# Patient Record
Sex: MALE | Race: AMERICAN INDIAN OR ALASKA NATIVE
[De-identification: names, ages, dates, MRNs, and addresses within clinical notes are randomized per-mention and may not be internally consistent; named-entity substitution may affect disease eponyms.]

---

## 2017-06-10 ENCOUNTER — HOSPITAL ENCOUNTER (EMERGENCY)
Dept: HOSPITAL 8 - ED | Age: 37
Discharge: HOME | End: 2017-06-10
Payer: MEDICAID

## 2017-06-10 VITALS — BODY MASS INDEX: 37.62 KG/M2 | WEIGHT: 262.79 LBS | HEIGHT: 70 IN

## 2017-06-10 VITALS — SYSTOLIC BLOOD PRESSURE: 125 MMHG | DIASTOLIC BLOOD PRESSURE: 80 MMHG

## 2017-06-10 DIAGNOSIS — M25.462: ICD-10-CM

## 2017-06-10 DIAGNOSIS — M25.562: Primary | ICD-10-CM

## 2017-07-15 ENCOUNTER — HOSPITAL ENCOUNTER (EMERGENCY)
Dept: HOSPITAL 8 - ED | Age: 37
Discharge: HOME | End: 2017-07-15
Payer: MEDICAID

## 2017-07-15 VITALS — WEIGHT: 258.82 LBS | BODY MASS INDEX: 37.05 KG/M2 | HEIGHT: 70 IN

## 2017-07-15 VITALS — SYSTOLIC BLOOD PRESSURE: 130 MMHG | DIASTOLIC BLOOD PRESSURE: 84 MMHG
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
## 2017-07-15 DIAGNOSIS — S39.012A: Primary | ICD-10-CM
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
## 2017-07-15 PROCEDURE — 99283 EMERGENCY DEPT VISIT LOW MDM: CPT

## 2017-08-22 ENCOUNTER — HOSPITAL ENCOUNTER (EMERGENCY)
Dept: HOSPITAL 8 - ED | Age: 37
Discharge: HOME | End: 2017-08-22
Payer: MEDICAID

## 2017-08-22 VITALS — HEIGHT: 71 IN | BODY MASS INDEX: 36.48 KG/M2 | WEIGHT: 260.59 LBS

## 2017-08-22 VITALS — SYSTOLIC BLOOD PRESSURE: 122 MMHG | DIASTOLIC BLOOD PRESSURE: 75 MMHG

## 2017-08-22 DIAGNOSIS — M62.830: ICD-10-CM

## 2017-08-22 DIAGNOSIS — M62.838: Primary | ICD-10-CM

## 2017-08-22 PROCEDURE — 99283 EMERGENCY DEPT VISIT LOW MDM: CPT

## 2017-08-22 PROCEDURE — 96372 THER/PROPH/DIAG INJ SC/IM: CPT

## 2019-02-09 ENCOUNTER — HOSPITAL ENCOUNTER (EMERGENCY)
Dept: HOSPITAL 8 - ED | Age: 39
Discharge: HOME | End: 2019-02-09
Payer: COMMERCIAL

## 2019-02-09 VITALS — WEIGHT: 279.11 LBS | BODY MASS INDEX: 39.96 KG/M2 | HEIGHT: 70 IN

## 2019-02-09 VITALS — SYSTOLIC BLOOD PRESSURE: 147 MMHG | DIASTOLIC BLOOD PRESSURE: 75 MMHG

## 2019-02-09 DIAGNOSIS — Z88.2: ICD-10-CM

## 2019-02-09 DIAGNOSIS — J41.1: Primary | ICD-10-CM

## 2019-02-09 PROCEDURE — 99283 EMERGENCY DEPT VISIT LOW MDM: CPT

## 2019-02-09 PROCEDURE — 71046 X-RAY EXAM CHEST 2 VIEWS: CPT

## 2019-02-09 NOTE — NUR
Pt arrives to ed with c/o of URI and cough. Pt reports he is losign his voice 
and now starting to develop HA. pt neuro intact and nadn.

## 2020-01-30 ENCOUNTER — PHYSICAL THERAPY (OUTPATIENT)
Dept: PHYSICAL THERAPY | Facility: REHABILITATION | Age: 40
End: 2020-01-30
Attending: FAMILY MEDICINE
Payer: OTHER GOVERNMENT

## 2020-01-30 DIAGNOSIS — M25.512 LEFT SHOULDER PAIN, UNSPECIFIED CHRONICITY: ICD-10-CM

## 2020-01-30 PROCEDURE — 97110 THERAPEUTIC EXERCISES: CPT

## 2020-01-30 PROCEDURE — 97161 PT EVAL LOW COMPLEX 20 MIN: CPT

## 2020-01-30 PROCEDURE — 97014 ELECTRIC STIMULATION THERAPY: CPT

## 2020-01-30 ASSESSMENT — ENCOUNTER SYMPTOMS
PAIN SCALE AT LOWEST: 2
PAIN SCALE: 4
PAIN SCALE AT HIGHEST: 6

## 2020-01-30 NOTE — OP THERAPY EVALUATION
Outpatient Physical Therapy  INITIAL EVALUATION    [facility] [address]  [address]  [facility] [address]  Phone:  [phone]  Fax:  [phone]    Date of Evaluation: 2020    Patient: Jared Chen  YOB: 1980  MRN: [id number]     Referring Provider: Lokesh Rodrigues M.D.  [address] [place] [address]   Referring Diagnosis Pain in left shoulder [M25.512]     Time Calculation  Start time: 48  Stop time: 940 Time Calculation (min): 52 minutes       Physical Therapy Occurrence Codes    Date of onset of impairment:  19   Date physical therapy care plan established or reviewed:  20   Date physical therapy treatment started:  20          Chief Complaint: No chief complaint on file.    Visit Diagnoses     ICD-10-CM   1. Left shoulder pain, unspecified chronicity M25.512         Subjective   History of Present Illness:     History of chief complaint:  Patient reports sudden onset of L shoulder pain with symptoms that are  episodic depending activity.  Patient reports that it may be a little better over the past few weeks but he has been doing less with his shoulder    Pain:     Current pain ratin    At best pain ratin    At worst pain ratin    Location:  On top of L shoulder with some neck tightness    Aggravating factors:  Sleep: up 3-4 /night  Shovel or split wood  Wash hair  Any movement reaching up/out        No past medical history on file.  Past Surgical History:   Procedure Laterality Date   • MANDIBLE FRACTURE ORIF  3/30/2009    Performed by JASPER FRANKLIN at SURGERY [facility] ORS   • ARCH BAR APPLICATION  3/30/2009    Performed by JASPER FRANKLIN at SURGERY [facility] ORS       Precautions:       Objective   Observation and functional movement:  Forward scap/head    Range of motion and strength:    Shoulder ROM:   AROM: R flexion: 175  Abduction: 170 HBB: t7 ER:60  AROM: L flexion: 150-erp Abduction: 135-erp HBB:t8  " ER:50-erp    Resisted cuff tests:  IR:                L: strong  ER:                  L:strong mild  empty can:                  L:mod, strong  Full can R:  L:strong  drop arm            L: mild pain  impingement sign:       L: +  (-) infraspinatus lag test and end range with 90 abd    Palpation and joint mobility:     ttp infra/t-minor and post deltoid            Therapeutic Treatments and Modalities:     Therapeutic Treatment and Modalities Summary: Cupping arom for flex/abd//all resisted tests strong\"almost no pain\"  Astatula #2 with modified box--hep  Russian 10/10 ball roll in s/l w/ #2 wrist weight x 7', then 8' mhp  // \"good no pain\"    Time-based treatments/modalities:          Assessment, Response and Plan:   Assessment details:  Patient presents with a RTC strain/tear with pain limiting AROM, difficulty sleeping and performing ADLs.  Patient presented with strong, painful resisted cuff testing, (+) impingement sign and (-) for infraspintaus lag sing.  Noted forward head and scapular positioning.  Patient should do well for goals if compliant with POC.  Prognosis: good    Goals:   Short Term Goals:   Sleep through night  Wash hair w/o pain  DASH<30%  Short term goal time span:  2-4 weeks      Long Term Goals:      split wood w/o pain  Wash hair w/o pain  DASH<10%  Sleep through night  DASH<30%  Long term goal time span:  6-8 weeks    Plan:   Therapy options:  Physical therapy treatment to continue  Planned therapy interventions:  Manual Therapy (CPT 78225), Therapeutic Exercise (CPT 47809) and E Stim Unattended (CPT 24740)  Other planned therapy interventions:  Dry needle  Frequency:  2x week  Duration in weeks:  8  Duration in visits:  13  Plan details:  Nicholas progression, scap stab, iastm/cupping/ DN?? E-stim      Functional Assessment Used        Referring provider co-signature:  I have reviewed this plan of care and my co-signature certifies the need for services.      Physician Signature: " ________________________________ Date: ______________

## 2020-02-04 ENCOUNTER — PHYSICAL THERAPY (OUTPATIENT)
Dept: PHYSICAL THERAPY | Facility: REHABILITATION | Age: 40
End: 2020-02-04
Attending: FAMILY MEDICINE
Payer: OTHER GOVERNMENT

## 2020-02-04 DIAGNOSIS — M25.512 LEFT SHOULDER PAIN, UNSPECIFIED CHRONICITY: ICD-10-CM

## 2020-02-04 PROCEDURE — 97014 ELECTRIC STIMULATION THERAPY: CPT

## 2020-02-04 PROCEDURE — 97110 THERAPEUTIC EXERCISES: CPT

## 2020-02-04 NOTE — OP THERAPY DAILY TREATMENT
Outpatient Physical Therapy  DAILY TREATMENT     [facility] [address].  [address]  [place] [address]  Phone:  [phone]  Fax:  [phone]    Date: 02/04/2020    Patient: Jared Chen  YOB: 1980  MRN: [id number]     Time Calculation  Start time: 0115  Stop time: 0200 Time Calculation (min): 45 minutes       Chief Complaint: No chief complaint on file.    Visit #: 2    SUBJECTIVE:  Sleeping better, less intense and less frequently---wash hair w/o pain    OBJECTIVE:  All resisted cuff test strong w/o pain    Therapeutic Treatments and Modalities:     Therapeutic Treatment and Modalities Summary: ube x 5' no pain  sahrman box/wall with #2 in front of mirror  Prone inés--hep  Wall inés--hep  S/l Yemeni 10/10 infra/deltoid ball roll x 15'-last 5'     Time-based treatments/modalities:  Therapeutic exercise minutes (CPT 91142): 25 minutes       Pain rating before treatment: 0  Pain rating after treatment: 0    ASSESSMENT:    limited low trap and infra strength but tolerated ex w.o pain    PLAN/RECOMMENDATIONS:   Inés progression

## 2020-02-12 ENCOUNTER — APPOINTMENT (OUTPATIENT)
Dept: PHYSICAL THERAPY | Facility: REHABILITATION | Age: 40
End: 2020-02-12
Attending: FAMILY MEDICINE
Payer: OTHER GOVERNMENT

## 2020-02-12 NOTE — OP THERAPY DAILY TREATMENT
Outpatient Physical Therapy  DAILY TREATMENT     [facility] [address].  [address]  [place] [address]  Phone:  [phone]  Fax:  [phone]    Date: 02/12/2020    Patient: Jared Chen  YOB: 1980  MRN: [id number]     Time Calculation               Chief Complaint: No chief complaint on file.    Visit #: 3    SUBJECTIVE:  Sleeping better, less intense and less frequently---wash hair w/o pain    OBJECTIVE:  All resisted cuff test strong w/o pain    Therapeutic Treatments and Modalities:     Therapeutic Treatment and Modalities Summary: ube x 5' no pain  sahrman box/wall with #2 in front of mirror  Prone angels--hep  Wall angels--hep  S/l Chilean 10/10 infra/deltoid ball roll x 15'-last 5'     FR progression    Time-based treatments/modalities:          Pain rating before treatment: 0  Pain rating after treatment: 0    ASSESSMENT:    limited low trap and infra strength but tolerated ex w.o pain    PLAN/RECOMMENDATIONS:   Urmila progression

## 2020-02-13 ENCOUNTER — APPOINTMENT (OUTPATIENT)
Dept: PHYSICAL THERAPY | Facility: REHABILITATION | Age: 40
End: 2020-02-13
Attending: FAMILY MEDICINE
Payer: OTHER GOVERNMENT

## 2020-02-18 ENCOUNTER — APPOINTMENT (OUTPATIENT)
Dept: PHYSICAL THERAPY | Facility: REHABILITATION | Age: 40
End: 2020-02-18
Attending: FAMILY MEDICINE
Payer: OTHER GOVERNMENT

## 2020-02-20 ENCOUNTER — APPOINTMENT (OUTPATIENT)
Dept: PHYSICAL THERAPY | Facility: REHABILITATION | Age: 40
End: 2020-02-20
Attending: FAMILY MEDICINE
Payer: OTHER GOVERNMENT

## 2020-02-23 ENCOUNTER — HOSPITAL ENCOUNTER (EMERGENCY)
Dept: HOSPITAL 8 - ED | Age: 40
Discharge: HOME | End: 2020-02-23
Payer: COMMERCIAL

## 2020-02-23 VITALS — BODY MASS INDEX: 41.03 KG/M2 | HEIGHT: 70 IN | WEIGHT: 286.6 LBS

## 2020-02-23 VITALS — DIASTOLIC BLOOD PRESSURE: 91 MMHG | SYSTOLIC BLOOD PRESSURE: 145 MMHG

## 2020-02-23 DIAGNOSIS — F17.200: ICD-10-CM

## 2020-02-23 DIAGNOSIS — B34.9: Primary | ICD-10-CM

## 2020-02-23 DIAGNOSIS — Z98.890: ICD-10-CM

## 2020-02-23 LAB
ALBUMIN SERPL-MCNC: 3.9 G/DL (ref 3.4–5)
ANION GAP SERPL CALC-SCNC: 8 MMOL/L (ref 5–15)
BASOPHILS # BLD AUTO: 0.02 X10^3/UL (ref 0–0.1)
BASOPHILS NFR BLD AUTO: 0 % (ref 0–1)
CALCIUM SERPL-MCNC: 8 MG/DL (ref 8.5–10.1)
CHLORIDE SERPL-SCNC: 109 MMOL/L (ref 98–107)
CREAT SERPL-MCNC: 0.98 MG/DL (ref 0.7–1.3)
EOSINOPHIL # BLD AUTO: 0.09 X10^3/UL (ref 0–0.4)
EOSINOPHIL NFR BLD AUTO: 2 % (ref 1–7)
ERYTHROCYTE [DISTWIDTH] IN BLOOD BY AUTOMATED COUNT: 14.1 % (ref 9.4–14.8)
LYMPHOCYTES # BLD AUTO: 0.71 X10^3/UL (ref 1–3.4)
LYMPHOCYTES NFR BLD AUTO: 14 % (ref 22–44)
MCH RBC QN AUTO: 31.6 PG (ref 27.5–34.5)
MCHC RBC AUTO-ENTMCNC: 34.6 G/DL (ref 33.2–36.2)
MCV RBC AUTO: 91.4 FL (ref 81–97)
MD: NO
MONOCYTES # BLD AUTO: 0.73 X10^3/UL (ref 0.2–0.8)
MONOCYTES NFR BLD AUTO: 14 % (ref 2–9)
NEUTROPHILS # BLD AUTO: 3.63 X10^3/UL (ref 1.8–6.8)
NEUTROPHILS NFR BLD AUTO: 70 % (ref 42–75)
PLATELET # BLD AUTO: 213 X10^3/UL (ref 130–400)
PMV BLD AUTO: 8.1 FL (ref 7.4–10.4)
RBC # BLD AUTO: 4.75 X10^6/UL (ref 4.38–5.82)

## 2020-02-23 PROCEDURE — 71045 X-RAY EXAM CHEST 1 VIEW: CPT

## 2020-02-23 PROCEDURE — 36415 COLL VENOUS BLD VENIPUNCTURE: CPT

## 2020-02-23 PROCEDURE — 94640 AIRWAY INHALATION TREATMENT: CPT

## 2020-02-23 PROCEDURE — 82040 ASSAY OF SERUM ALBUMIN: CPT

## 2020-02-23 PROCEDURE — 85025 COMPLETE CBC W/AUTO DIFF WBC: CPT

## 2020-02-23 PROCEDURE — 99284 EMERGENCY DEPT VISIT MOD MDM: CPT

## 2020-02-23 PROCEDURE — 80048 BASIC METABOLIC PNL TOTAL CA: CPT

## 2020-02-23 PROCEDURE — 87400 INFLUENZA A/B EACH AG IA: CPT

## 2020-02-23 NOTE — NUR
AMBULATED BACK TO ROOM WITH STEADY GAIT. PT PRESENTS TO ED WITH C/O BODY ACHES, 
SUBJECTIVE FEVER, AND NON PRODUCTIVE COUGH X 1 WEEKS. DENIES BEING AROUND ANY 
SICK RECENTLY. MILD AMOUNT OF DISTRESS NOTED. BREATHING REGULAR AND UNLABORED. 
POC DISCUSSED. WILL CONTINUE TO MONITOR.

## 2020-03-26 ENCOUNTER — TELEPHONE (OUTPATIENT)
Dept: PHYSICAL THERAPY | Facility: REHABILITATION | Age: 40
End: 2020-03-26

## 2020-03-26 NOTE — OP THERAPY DISCHARGE SUMMARY
Outpatient Physical Therapy  DISCHARGE SUMMARY NOTE      [address]  [address]  [place] [address]  Phone:  [phone]  Fax:  [phone]    Date of Visit: 03/26/2020    Patient: Jared Chen  YOB: 1980  MRN: [id number]     Referring Provider:  Lokesh Rodrigues   Referring Diagnosis  Left shoulder pain, unspecified chronicity M25.512        Physical Therapy Occurrence Codes    Date of onset of impairment:  12/16/19   Date physical therapy care plan established or reviewed:  1/30/20   Date physical therapy treatment started:  1/30/20          Functional Assessment Used        Your patient is being discharged from Physical Therapy with the following comments:  Patient was only seen for two vistis  · Goals partially met  SUBJECTIVE:  Upon last visit, patient reported Sleeping better, less intense and less frequent pain and was able to wash hair w/o pain     OBJECTIVE:  All resisted cuff test strong w/o pain       ASSESSMENT:   limited low trap and infra strength but tolerated ex w/o pain     No patient contact since  2 / 4 /20 .  Patient is independent with his HEP and is being discharged from therapy at this time.         Osman Razo, PT, DPT, OCS    Date: 3/26/2020        None

## 2020-08-13 ENCOUNTER — HOSPITAL ENCOUNTER (EMERGENCY)
Dept: HOSPITAL 8 - ED | Age: 40
Discharge: LEFT BEFORE DISCHARGE | End: 2020-08-13
Payer: COMMERCIAL

## 2020-08-13 ENCOUNTER — HOSPITAL ENCOUNTER (EMERGENCY)
Facility: MEDICAL CENTER | Age: 40
End: 2020-08-13
Attending: EMERGENCY MEDICINE | Admitting: EMERGENCY MEDICINE
Payer: OTHER GOVERNMENT

## 2020-08-13 VITALS
HEART RATE: 80 BPM | DIASTOLIC BLOOD PRESSURE: 92 MMHG | TEMPERATURE: 98 F | RESPIRATION RATE: 16 BRPM | HEIGHT: 70 IN | BODY MASS INDEX: 35.52 KG/M2 | SYSTOLIC BLOOD PRESSURE: 132 MMHG | OXYGEN SATURATION: 98 %

## 2020-08-13 DIAGNOSIS — Z53.21: ICD-10-CM

## 2020-08-13 DIAGNOSIS — M54.9: Primary | ICD-10-CM

## 2020-08-13 DIAGNOSIS — M54.50 ACUTE LEFT-SIDED LOW BACK PAIN WITHOUT SCIATICA: ICD-10-CM

## 2020-08-13 LAB
APPEARANCE UR: CLEAR
BILIRUB UR QL STRIP.AUTO: NEGATIVE
COLOR UR: YELLOW
EKG IMPRESSION: NORMAL
GLUCOSE UR STRIP.AUTO-MCNC: NEGATIVE MG/DL
KETONES UR STRIP.AUTO-MCNC: NEGATIVE MG/DL
LEUKOCYTE ESTERASE UR QL STRIP.AUTO: NEGATIVE
MICRO URNS: NORMAL
NITRITE UR QL STRIP.AUTO: NEGATIVE
PH UR STRIP.AUTO: 5.5 [PH] (ref 5–8)
PROT UR QL STRIP: NEGATIVE MG/DL
RBC UR QL AUTO: NEGATIVE
SP GR UR REFRACTOMETRY: 1.03

## 2020-08-13 PROCEDURE — 96372 THER/PROPH/DIAG INJ SC/IM: CPT

## 2020-08-13 PROCEDURE — 93005 ELECTROCARDIOGRAM TRACING: CPT

## 2020-08-13 PROCEDURE — 81003 URINALYSIS AUTO W/O SCOPE: CPT

## 2020-08-13 PROCEDURE — 93005 ELECTROCARDIOGRAM TRACING: CPT | Performed by: EMERGENCY MEDICINE

## 2020-08-13 PROCEDURE — 700111 HCHG RX REV CODE 636 W/ 250 OVERRIDE (IP): Performed by: EMERGENCY MEDICINE

## 2020-08-13 PROCEDURE — 99283 EMERGENCY DEPT VISIT LOW MDM: CPT

## 2020-08-13 RX ORDER — KETOROLAC TROMETHAMINE 30 MG/ML
30 INJECTION, SOLUTION INTRAMUSCULAR; INTRAVENOUS ONCE
Status: COMPLETED | OUTPATIENT
Start: 2020-08-13 | End: 2020-08-13

## 2020-08-13 RX ADMIN — KETOROLAC TROMETHAMINE 30 MG: 30 INJECTION, SOLUTION INTRAMUSCULAR at 20:34

## 2020-08-14 NOTE — ED PROVIDER NOTES
"ED Provider Note    CHIEF COMPLAINT  Chief Complaint   Patient presents with   • Low Back Pain       HPI  Jared Chen is a 39 y.o. male who presents to the emergency department chief complaint of left low back pain.  He states he woke up this morning and is just been generally present it is worse with movement does not radiate to much of the abdomen to the groin of the testicles to the upper back to the legs buttocks there is no associate weakness numbness or tingling is about a 6 out of 10 and worse with movement he took some Tylenol this morning for a headache but thought it would get better on its own and when it did not he decided to come in to the ED.  He denies any acute trauma.    REVIEW OF SYSTEMS  Positives as above. Pertinent negatives include weakness numbness tingling abdominal pain dysuria hematuria testicular pain fevers chills cough congestion rash trauma  All other review of systems are negative    PAST MEDICAL HISTORY   has a past medical history of Heart murmur.    SOCIAL HISTORY  Social History     Tobacco Use   • Smoking status: Light Tobacco Smoker     Packs/day: 0.50     Types: Cigarettes   • Smokeless tobacco: Former User   Substance and Sexual Activity   • Alcohol use: No   • Drug use: No     Types: Inhaled   • Sexual activity: Not on file       SURGICAL HISTORY   has a past surgical history that includes mandible fracture orif (3/30/2009) and arch bar application (3/30/2009).    CURRENT MEDICATIONS  Home Medications    **Home medications have not yet been reviewed for this encounter**         ALLERGIES  Allergies   Allergen Reactions   • Sulfa Drugs Unspecified     Pt states \"I was a childhood allergie, not sure what happens\".         PHYSICAL EXAM  VITAL SIGNS: /92   Pulse 81   Temp 36.7 °C (98 °F) (Temporal)   Resp 16   Ht 1.778 m (5' 10\")   SpO2 98%   BMI 35.52 kg/m²    Pulse ox interpretation: I interpret this pulse ox as normal.  Constitutional: Alert in no " apparent distress.  HENT: Normocephalic, Atraumatic, MMM  Eyes: PERound. Conjunctiva normal, non-icteric.   Heart: Regular rate and rhythm, no murmurs.    Lungs: Clear to auscultation bilaterally. No resp distress, breath sounds equal  Abdomen: Questionable mid flank belly tenderness but only in very deep palpation states it radiates to his back, non-distended, normal bowel sounds  Back: Low left paraspinal muscular tenderness no SI tenderness no CVA tenderness no midline tenderness of any part of the back  Skin: Warm, Dry, No erythema, No rash.   Neurologic: Alert and oriented, Grossly non-focal.       DIFFERENTIAL DIAGNOSIS AND WORK UP PLAN    This is a 39 y.o. male who presents with most likely a left low back musculoskeletal strain or sprain, there is no evidence of rash no swelling no step-offs does not radiate anywhere there is no evidence of sciatica, this could be an atypical renal stone but his abdomen is really soft nontender no nausea or vomiting his vital signs are otherwise well-appearing.  Will evaluate with urinalysis for any signs of hematuria and if that is positive will perform a CT scan otherwise we will treat the patient with some Toradol for the muscular discomfort    Pertinent Lab Findings  Labs Reviewed   URINALYSIS,CULTURE IF INDICATED   REFRACTOMETER SG       Radiology  No orders to display     The radiologist's interpretation of all radiological studies have been reviewed by me.    COURSE & MEDICAL DECISION MAKING  Pertinent Labs & Imaging studies reviewed. (See chart for details)    8:26 PM  I reassessed patient at the bedside, there is no blood in his urine he did not really have any abdominal pain so I doubt this is a renal stone, no neurologic complaints that are associated with a pinched nerve mostly lower back muscular strain or sprain.  He will receive some Toradol we discussed stretching ice and ibuprofen as needed he will return to the emerge department for any new or worsening  "issues.    /92   Pulse 80   Temp 36.7 °C (98 °F) (Temporal)   Resp 16   Ht 1.778 m (5' 10\")   SpO2 98%   BMI 35.52 kg/m²       I verified that the patient was wearing a mask and I was wearing appropriate PPE every time I entered the room. The patient's mask was on the patient at all times during my encounter except for a brief view of the oropharynx.    The patient will return for new or worsening symptoms and is stable at the time of discharge.    The patient is referred to a primary physician for blood pressure management, diabetic screening, and for all other preventative health concerns.    DISPOSITION:  Patient will be discharged home in stable condition.    FOLLOW UP:  Lokesh Rodrigues M.D.  [address] [facility] [address] [phone]    Schedule an appointment as soon as possible for a visit       [facility], Emergency Dept  [address]  [facility] [id number] [phone]    If symptoms worsen      OUTPATIENT MEDICATIONS:  Discharge Medication List as of 8/13/2020  8:37 PM            FINAL IMPRESSION  1. Acute left-sided low back pain without sciatica                Electronically signed by: Yasmin Dejesus M.D., 8/13/2020 8:03 PM    This dictation has been created using voice recognition software and/or scribes. The accuracy of the dictation is limited by the abilities of the software and the expertise of the scribes. I expect there may be some errors of grammar and possibly content. I made every attempt to manually correct the errors within my dictation. However, errors related to voice recognition software and/or scribes may still exist and should be interpreted within the appropriate context.    "

## 2020-08-14 NOTE — ED TRIAGE NOTES
Pt presents by self from home for left low back pain that started this morning upon waking. Denies any dysuria or abnormal movements. Took tylenol around 1230 for unrelated headache. Pt A&Ox4 and ambulatory.     Patient masked. No respiratory symptoms, no recent travel, denies known COVID exposure.     Pt reported chest pain that occurred briefly yesterday after coughing hard - this was reported to ER tech and EKG obtained.

## 2021-10-29 ENCOUNTER — APPOINTMENT (OUTPATIENT)
Dept: RADIOLOGY | Facility: MEDICAL CENTER | Age: 41
End: 2021-10-29
Attending: EMERGENCY MEDICINE
Payer: MEDICAID

## 2021-10-29 ENCOUNTER — HOSPITAL ENCOUNTER (EMERGENCY)
Facility: MEDICAL CENTER | Age: 41
End: 2021-10-29
Attending: EMERGENCY MEDICINE
Payer: MEDICAID

## 2021-10-29 VITALS
RESPIRATION RATE: 19 BRPM | WEIGHT: 247 LBS | TEMPERATURE: 97.5 F | DIASTOLIC BLOOD PRESSURE: 61 MMHG | SYSTOLIC BLOOD PRESSURE: 109 MMHG | HEART RATE: 75 BPM | BODY MASS INDEX: 35.36 KG/M2 | OXYGEN SATURATION: 96 % | HEIGHT: 70 IN

## 2021-10-29 DIAGNOSIS — R07.9 CHEST PAIN, UNSPECIFIED TYPE: ICD-10-CM

## 2021-10-29 LAB
ALBUMIN SERPL BCP-MCNC: 4.1 G/DL (ref 3.2–4.9)
ALBUMIN/GLOB SERPL: 1.4 G/DL
ALP SERPL-CCNC: 111 U/L (ref 30–99)
ALT SERPL-CCNC: 46 U/L (ref 2–50)
ANION GAP SERPL CALC-SCNC: 11 MMOL/L (ref 7–16)
AST SERPL-CCNC: 29 U/L (ref 12–45)
BASOPHILS # BLD AUTO: 0.5 % (ref 0–1.8)
BASOPHILS # BLD: 0.04 K/UL (ref 0–0.12)
BILIRUB SERPL-MCNC: 0.3 MG/DL (ref 0.1–1.5)
BUN SERPL-MCNC: 17 MG/DL (ref 8–22)
CALCIUM SERPL-MCNC: 9.1 MG/DL (ref 8.5–10.5)
CHLORIDE SERPL-SCNC: 106 MMOL/L (ref 96–112)
CO2 SERPL-SCNC: 23 MMOL/L (ref 20–33)
CREAT SERPL-MCNC: 0.7 MG/DL (ref 0.5–1.4)
CRP SERPL HS-MCNC: 1.33 MG/DL (ref 0–0.75)
D DIMER PPP IA.FEU-MCNC: 0.36 UG/ML (FEU) (ref 0–0.5)
EKG IMPRESSION: NORMAL
EOSINOPHIL # BLD AUTO: 0.22 K/UL (ref 0–0.51)
EOSINOPHIL NFR BLD: 2.9 % (ref 0–6.9)
ERYTHROCYTE [DISTWIDTH] IN BLOOD BY AUTOMATED COUNT: 44.8 FL (ref 35.9–50)
ERYTHROCYTE [SEDIMENTATION RATE] IN BLOOD BY WESTERGREN METHOD: 14 MM/HOUR (ref 0–20)
GLOBULIN SER CALC-MCNC: 3 G/DL (ref 1.9–3.5)
GLUCOSE SERPL-MCNC: 112 MG/DL (ref 65–99)
HCT VFR BLD AUTO: 46.1 % (ref 42–52)
HGB BLD-MCNC: 15.6 G/DL (ref 14–18)
IMM GRANULOCYTES # BLD AUTO: 0.09 K/UL (ref 0–0.11)
IMM GRANULOCYTES NFR BLD AUTO: 1.2 % (ref 0–0.9)
LYMPHOCYTES # BLD AUTO: 1.77 K/UL (ref 1–4.8)
LYMPHOCYTES NFR BLD: 23.4 % (ref 22–41)
MCH RBC QN AUTO: 29.8 PG (ref 27–33)
MCHC RBC AUTO-ENTMCNC: 33.8 G/DL (ref 33.7–35.3)
MCV RBC AUTO: 88.1 FL (ref 81.4–97.8)
MONOCYTES # BLD AUTO: 0.68 K/UL (ref 0–0.85)
MONOCYTES NFR BLD AUTO: 9 % (ref 0–13.4)
NEUTROPHILS # BLD AUTO: 4.78 K/UL (ref 1.82–7.42)
NEUTROPHILS NFR BLD: 63 % (ref 44–72)
NRBC # BLD AUTO: 0 K/UL
NRBC BLD-RTO: 0 /100 WBC
PLATELET # BLD AUTO: 298 K/UL (ref 164–446)
PMV BLD AUTO: 9.9 FL (ref 9–12.9)
POTASSIUM SERPL-SCNC: 4.1 MMOL/L (ref 3.6–5.5)
PROT SERPL-MCNC: 7.1 G/DL (ref 6–8.2)
RBC # BLD AUTO: 5.23 M/UL (ref 4.7–6.1)
SODIUM SERPL-SCNC: 140 MMOL/L (ref 135–145)
TROPONIN T SERPL-MCNC: <6 NG/L (ref 6–19)
TROPONIN T SERPL-MCNC: <6 NG/L (ref 6–19)
WBC # BLD AUTO: 7.6 K/UL (ref 4.8–10.8)

## 2021-10-29 PROCEDURE — 93005 ELECTROCARDIOGRAM TRACING: CPT | Performed by: EMERGENCY MEDICINE

## 2021-10-29 PROCEDURE — 80053 COMPREHEN METABOLIC PANEL: CPT

## 2021-10-29 PROCEDURE — 93005 ELECTROCARDIOGRAM TRACING: CPT

## 2021-10-29 PROCEDURE — 84484 ASSAY OF TROPONIN QUANT: CPT

## 2021-10-29 PROCEDURE — 71045 X-RAY EXAM CHEST 1 VIEW: CPT

## 2021-10-29 PROCEDURE — 85379 FIBRIN DEGRADATION QUANT: CPT

## 2021-10-29 PROCEDURE — 85025 COMPLETE CBC W/AUTO DIFF WBC: CPT

## 2021-10-29 PROCEDURE — 99283 EMERGENCY DEPT VISIT LOW MDM: CPT

## 2021-10-29 PROCEDURE — 86140 C-REACTIVE PROTEIN: CPT

## 2021-10-29 PROCEDURE — 700102 HCHG RX REV CODE 250 W/ 637 OVERRIDE(OP): Performed by: EMERGENCY MEDICINE

## 2021-10-29 PROCEDURE — 85652 RBC SED RATE AUTOMATED: CPT

## 2021-10-29 PROCEDURE — A9270 NON-COVERED ITEM OR SERVICE: HCPCS | Performed by: EMERGENCY MEDICINE

## 2021-10-29 RX ORDER — ASPIRIN 81 MG/1
324 TABLET, CHEWABLE ORAL ONCE
Status: COMPLETED | OUTPATIENT
Start: 2021-10-29 | End: 2021-10-29

## 2021-10-29 RX ADMIN — ASPIRIN 324 MG: 81 TABLET, CHEWABLE ORAL at 09:10

## 2021-10-29 ASSESSMENT — ENCOUNTER SYMPTOMS
SHORTNESS OF BREATH: 0
FEVER: 0
COUGH: 0

## 2021-10-29 ASSESSMENT — PAIN DESCRIPTION - PAIN TYPE: TYPE: ACUTE PAIN

## 2021-10-29 NOTE — ED TRIAGE NOTES
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 No longer present.     Patient brought back from triage due to an abnormal EKG. Patient came in for above complaint. Patient seen by ERP at bedside. Pain is currently subsided now that he has rested. VSS.

## 2021-10-29 NOTE — ED PROVIDER NOTES
ED Provider Note    Scribed for Magnolia Jones M.D. by Amos An. 10/29/2021, 8:41 AM.    Primary care provider: Lokesh Rodrigues M.D.  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 No longer present.       HPI  Jared Chen is a 40 y.o. male who presents to the Emergency Department for acute chest pain. Patient states around 20 minutes prior to coming in he started to have chest pain that is centrally located.  The pain was moderate in severity and throbbing, is now resolving.  He reports having a history of heart murmur as a child, but no other cardiac history. Patient has had episodes of chest pain in the past, but notes his pain today felt somewhat different. His pain is alleviated with rest. Patient otherwise has felt normally, denies any fevers, shortness of breath, cough, or leg swelling.     REVIEW OF SYSTEMS  Review of Systems   Constitutional: Negative for fever.   Respiratory: Negative for cough and shortness of breath.    Cardiovascular: Positive for chest pain. Negative for leg swelling.   All other systems reviewed and are negative.    PAST MEDICAL HISTORY   has a past medical history of Heart murmur.    SURGICAL HISTORY   has a past surgical history that includes mandible fracture orif (3/30/2009) and arch bar application (3/30/2009).    SOCIAL HISTORY  Social History     Tobacco Use   • Smoking status: Light Tobacco Smoker     Packs/day: 0.25     Types: Cigarettes   • Smokeless tobacco: Former User   Vaping Use   • Vaping Use: Never used   Substance Use Topics   • Alcohol use: No   • Drug use: No     Types: Inhaled      Social History     Substance and Sexual Activity   Drug Use No   • Types: Inhaled       FAMILY HISTORY  History reviewed. No pertinent family history.    CURRENT  "MEDICATIONS  Home Medications     Reviewed by Hayden Wilkinson R.N. (Registered Nurse) on 10/29/21 at 0853  Med List Status: Partial   Medication Last Dose Status   hydrocodone-acetaminophen (NORCO) 5-325 MG Tab per tablet  Active   ibuprofen (MOTRIN) 200 MG Tab  Active                 ALLERGIES  Allergies   Allergen Reactions   • Sulfa Drugs Unspecified     Pt states \"I was a childhood allergie, not sure what happens\".         PHYSICAL EXAM  VITAL SIGNS: /94   Pulse 88   Temp 36.3 °C (97.4 °F) (Temporal)   Resp 18   Ht 1.778 m (5' 10\")   SpO2 97%   BMI 35.52 kg/m²   Vitals reviewed by myself.  Physical Exam  Nursing note and vitals reviewed.  Constitutional: Well-developed and well-nourished. No acute distress.   HENT: Head is normocephalic and atraumatic.  Eyes: extra-ocular movements intact  Cardiovascular: Regular rate and regular rhythm. No murmur heard.  2+ radial pulses equal bilaterally  Pulmonary/Chest: Breath sounds normal. No wheezes or rales.   Abdominal: Soft and non-tender. No distention.    Musculoskeletal: Extremities exhibit normal range of motion without edema or tenderness.   Neurological: Awake and alert  Skin: Skin is warm and dry. No rash.       DIAGNOSTIC STUDIES  LABS  Labs Reviewed   CBC WITH DIFFERENTIAL - Abnormal; Notable for the following components:       Result Value    Immature Granulocytes 1.20 (*)     All other components within normal limits   COMP METABOLIC PANEL - Abnormal; Notable for the following components:    Glucose 112 (*)     Alkaline Phosphatase 111 (*)     All other components within normal limits   CRP QUANTITIVE (NON-CARDIAC) - Abnormal; Notable for the following components:    Stat C-Reactive Protein 1.33 (*)     All other components within normal limits   TROPONIN   D-DIMER   ESTIMATED GFR   TROPONIN   SED RATE     All labs reviewed by me.    EKG Interpretation:  Interpreted by myself    12 Lead EKG interpreted by me to show:  EKG at 8:29 AM: Normal " sinus rhythm, heart rate 72, normal axis, normal intervals, , QRS 88, QTc 408, no acute ST-T segment changes, nonspecific diffuse 1 mm ST elevation in all leads which is unchanged from prior EKG in August 2020, no evidence of acute arrhythmia or ischemia  My impression of this EKG: Does not indicate ischemia or arrhythmia at this time.    RADIOLOGY  DX-CHEST-PORTABLE (1 VIEW)   Final Result      No acute cardiac or pulmonary abnormalities are identified.        The radiologist's interpretation of all radiological studies have been reviewed by me.    REASSESSMENT    8:41 AM - EKG in triage showed mild ST elevations and was seen and evaluated urgently. Discussed plan of care, including ordering labs, imaging, EKG, and treating with aspirin. Patient agrees to the plan of care.      11:05 AM - Patient reevaluated at bedside; he is feeling improved. Updated him on results so far.     COURSE & MEDICAL DECISION MAKING  Nursing notes, VS, PMSFHx reviewed in chart.    Patient is a 40-year-old male who comes in for chest pain.  Differential diagnosis includes arrhythmia, acute coronary syndrome, angina, pericarditis, myocarditis.  Diagnostic work-up includes labs, EKG and chest x-ray.    Patient's initial vitals are within normal limits.  His chest pain is resolving by the time I evaluated him.  He is treated with aspirin for any residual discomfort.  EKG returns demonstrates mild ST elevations in diffuse leads which are unchanged from prior.  Initial troponin is negative, this may just be normal variant EKG.  Discussed the case with cardiologist Dr. Nair who recommends obtaining inflammatory markers to assess for any signs of pericarditis.  Inflammatory markers returned and CRP is mildly elevated.  Repeat troponin is negative.  Patient has a HEART score of 1 making acute coronary syndrome unlikely.  Discussed the case again with Dr. Nair regarding the mildly elevated CRP, and as patient symptoms have resolved she  recommends following up in clinic.  Patient is amenable to this plan.  Referrals placed for cardiology clinic and patient is given strict return precautions.  He is then discharged in stable condition.        FINAL IMPRESSION  1. Chest pain, unspecified type          I, Amos An (Scribe)deborah scribing for, and in the presence of, Magnolia Jones M.D..    Electronically signed by: Amos An (Scribe), 10/29/2021    IMagnolia M.D. personally performed the services described in this documentation, as scribed by Amos An in my presence, and it is both accurate and complete.     The note accurately reflects work and decisions made by me.  Magnolia Jones M.D.  10/29/2021  1:27 PM

## 2021-10-29 NOTE — ED NOTES
Discharge teaching and paperwork provided regarding chest pain and all questions/concerns answered. VSS, cardiac assessment stable and PIV removed. Given information regarding home care and reasons to follow up with ED or primary MD. Patient discharged to the care of himself and ambulated out of the ED.

## 2021-12-20 ENCOUNTER — TELEPHONE (OUTPATIENT)
Dept: CARDIOLOGY | Facility: MEDICAL CENTER | Age: 41
End: 2021-12-20

## 2021-12-20 NOTE — TELEPHONE ENCOUNTER
Chart Prep  LVM for Pt to CB in regards to requesting records for NP appt with Dr. Trotter. primarily regarding if pt has seen a prior cardiologist, if any recent cardiac testing/imaging has [facility] and pt's most recent labs. LVM to CB @[phone].

## 2021-12-22 ASSESSMENT — ENCOUNTER SYMPTOMS
DIARRHEA: 0
NEAR-SYNCOPE: 0
PND: 0
WHEEZING: 0
SYNCOPE: 0
ABDOMINAL PAIN: 0
NAUSEA: 0
FEVER: 0
PALPITATIONS: 0
VOMITING: 0
COUGH: 0
SHORTNESS OF BREATH: 0
ORTHOPNEA: 0
FOCAL WEAKNESS: 0
DYSPNEA ON EXERTION: 0
NIGHT SWEATS: 0
WEAKNESS: 0
IRREGULAR HEARTBEAT: 0
DIZZINESS: 0

## 2021-12-22 NOTE — PROGRESS NOTES
Cardiology Initial Consultation Note    Date of note:    12/23/2021    Primary Care Provider: Lokesh Rodrigues M.D.  Referring Provider: Magnolia Jones M.D.    Patient Name: Jared Chen   YOB: 1980  MRN:              [id number]    Chief Complaint: Chest pain    History of Present Illness: Mr. Jared Chen is a 41 y.o. male with obesity and current smoker but no prior cardiac history who is here for cardiac consultation for chest pain.    The patient was recently seen in the ER on 10/29/2021 for chest pain.  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  EKG was unremarkable and troponin was negative.  As such, the patient was discharged to follow-up in cardiology clinic.    The patient presents today to discuss symptoms.  The patient reports that he has been under a lot of family stress and job stress.  He has not had any recurrent episodes since the ER visit.  He walks a lot at work when he used to work, but does get short of breath with exertion.  He denies any orthopnea, PND, or leg swelling.  No palpitations.  No syncope or presyncopal episodes.    Cardiovascular Risk Factors:  1. Smoking status: Light tobacco smoker  2. Type II Diabetes Mellitus: None/not recently checked  3. Hypertension: None  4. Dyslipidemia: None/not recently checked  5. Family history of early Coronary Artery Disease in a first degree relative (Male less than 55 years of age; Female less than 65 years of age): None  6.  Obesity and/or Metabolic Syndrome: BMI 35.12  7. Sedentary lifestyle: Not active but not sedentary, walks a lot    Review of Systems   Constitutional: Negative for fever, malaise/fatigue and night sweats.   Cardiovascular: Negative for chest pain, dyspnea on exertion, irregular heartbeat, leg swelling, near-syncope, orthopnea, palpitations, paroxysmal nocturnal dyspnea and syncope.   Respiratory: Negative for cough,  "shortness of breath and wheezing.    Gastrointestinal: Negative for abdominal pain, diarrhea, nausea and vomiting.   Neurological: Negative for dizziness, focal weakness and weakness.       All other systems reviewed and are negative.       Current Outpatient Medications   Medication Sig Dispense Refill   • Acetaminophen (TYLENOL PO) Take  by mouth every day.     • ibuprofen (MOTRIN) 200 MG Tab Take 1,000 mg by mouth every 8 hours as needed for Mild Pain.       No current facility-administered medications for this visit.         Allergies   Allergen Reactions   • Sulfa Drugs Unspecified     Pt states \"I was a childhood allergie, not sure what happens\".         Physical Exam:  Ambulatory Vitals  /72 (BP Location: Left arm, Patient Position: Sitting, BP Cuff Size: Adult)   Pulse 93   Resp 16   Ht 1.803 m (5' 11\")   Wt 114 kg (251 lb 12.8 oz)   SpO2 97%    Oxygen Therapy:  Pulse Oximetry: 97 %  BP Readings from Last 4 Encounters:   12/23/21 100/72   10/29/21 109/61   08/13/20 132/92   11/20/16 140/75       Weight/BMI: Body mass index is 35.12 kg/m².  Wt Readings from Last 4 Encounters:   12/23/21 114 kg (251 lb 12.8 oz)   10/29/21 112 kg (247 lb)   11/20/16 112 kg (247 lb 9.2 oz)   07/12/16 109 kg (240 lb)         General: Well appearing and in no apparent distress  Eyes: nl conjunctiva, no icteric sclera  ENT: wearing a mask, normal external appearance of ears  Neck: no visible JVP,  no carotid bruits  Lungs: normal respiratory effort, CTAB  Heart: RRR, no murmurs, no rubs or gallops,  no edema bilateral lower extremities. No LV/RV heave on cardiac palpatation. + bilateral radial pulses.  + bilateral dp pulses.   Abdomen: soft, non tender, non distended, no masses, normal bowel sounds.  No HSM.  Extremities/MSK: no clubbing, no cyanosis  Neurological: No focal sensory deficits  Psychiatric: Appropriate affect, A/O x 3, intact judgement and insight  Skin: Warm extremities      Lab Data Review:  No results " found for: CHOLSTRLTOT, LDL, HDL, TRIGLYCERIDE    Lab Results   Component Value Date/Time    SODIUM 140 10/29/2021 08:57 AM    POTASSIUM 4.1 10/29/2021 08:57 AM    CHLORIDE 106 10/29/2021 08:57 AM    CO2 23 10/29/2021 08:57 AM    GLUCOSE 112 (H) 10/29/2021 08:57 AM    BUN 17 10/29/2021 08:57 AM    CREATININE 0.70 10/29/2021 08:57 AM    CREATININE 1.0 03/30/2009 03:45 PM     Lab Results   Component Value Date/Time    ALKPHOSPHAT 111 (H) 10/29/2021 08:57 AM    ASTSGOT 29 10/29/2021 08:57 AM    ALTSGPT 46 10/29/2021 08:57 AM    TBILIRUBIN 0.3 10/29/2021 08:57 AM      Lab Results   Component Value Date/Time    WBC 7.6 10/29/2021 08:57 AM     No results found for: HBA1C      Cardiac Imaging and Procedures Review:    EKG dated 12/23/2021: My personal interpretation is sinus rhythm, ST elevation, early repolarization    No prior echocardiogram    Assessment & Plan     1. Chest pain, unspecified type  EKG    Cardiac Stress Test Treadmill Only    EC-ECHOCARDIOGRAM COMPLETE W/O CONT   2. History of heart murmur in childhood  EC-ECHOCARDIOGRAM COMPLETE W/O CONT   3. Dyspnea on exertion  EC-ECHOCARDIOGRAM COMPLETE W/O CONT   4. Health maintenance examination  Lipid Profile         Shared Medical Decision Making:    Chest pain  Dyspnea on exertion  History of heart murmur  Chest pain sounds typical.  Risk factors include obesity and current smoking  -We will obtain treadmill EKG to assess for any ischemia and BP response to exercise  -Obtain echocardiogram given dyspnea on exertion and history of heart murmur.  -We will obtain lipid panel prior to next visit      All of the patient's excellent questions were answered to the best of my knowledge and to his satisfaction.  It was a pleasure seeing Mr. Jared Chen in my clinic today. Return in about 10 weeks (around 3/3/2022). Patient is aware to call the cardiology clinic with any questions or concerns.      Ariadna Trotter MD  [facility]  [place]  [facility], [place]  [address] [address]  Phone: [phone]  Fax: [phone]

## 2021-12-23 ENCOUNTER — OFFICE VISIT (OUTPATIENT)
Dept: CARDIOLOGY | Facility: MEDICAL CENTER | Age: 41
End: 2021-12-23
Payer: MEDICAID

## 2021-12-23 VITALS
SYSTOLIC BLOOD PRESSURE: 100 MMHG | OXYGEN SATURATION: 97 % | WEIGHT: 251.8 LBS | RESPIRATION RATE: 16 BRPM | BODY MASS INDEX: 35.25 KG/M2 | DIASTOLIC BLOOD PRESSURE: 72 MMHG | HEART RATE: 93 BPM | HEIGHT: 71 IN

## 2021-12-23 DIAGNOSIS — Z00.00 HEALTH MAINTENANCE EXAMINATION: ICD-10-CM

## 2021-12-23 DIAGNOSIS — R07.9 CHEST PAIN, UNSPECIFIED TYPE: ICD-10-CM

## 2021-12-23 DIAGNOSIS — R06.09 DYSPNEA ON EXERTION: ICD-10-CM

## 2021-12-23 DIAGNOSIS — Z86.79 HISTORY OF HEART MURMUR IN CHILDHOOD: ICD-10-CM

## 2021-12-23 LAB — EKG IMPRESSION: NORMAL

## 2021-12-23 PROCEDURE — 93000 ELECTROCARDIOGRAM COMPLETE: CPT | Performed by: STUDENT IN AN ORGANIZED HEALTH CARE EDUCATION/TRAINING PROGRAM

## 2021-12-23 PROCEDURE — 99214 OFFICE O/P EST MOD 30 MIN: CPT | Performed by: STUDENT IN AN ORGANIZED HEALTH CARE EDUCATION/TRAINING PROGRAM

## 2021-12-23 RX ORDER — AZITHROMYCIN 250 MG/1
TABLET, FILM COATED ORAL
COMMUNITY
Start: 2021-10-25 | End: 2022-04-07

## 2021-12-23 RX ORDER — BENZONATATE 100 MG/1
CAPSULE ORAL
COMMUNITY
Start: 2021-10-25 | End: 2022-04-07

## 2021-12-23 RX ORDER — DEXTROMETHORPHAN HBR, GUAIFENESIN 20; 200 MG/10ML; MG/10ML
SOLUTION ORAL
COMMUNITY
Start: 2021-10-25 | End: 2022-04-07

## 2021-12-23 RX ORDER — ACETAMINOPHEN 325 MG/1
TABLET ORAL
COMMUNITY
Start: 2021-10-25

## 2021-12-23 RX ORDER — HYDROCORTISONE 1 G/100G
CREAM TOPICAL
COMMUNITY
Start: 2021-10-25 | End: 2022-04-07

## 2021-12-23 ASSESSMENT — FIBROSIS 4 INDEX: FIB4 SCORE: 0.56

## 2021-12-30 ENCOUNTER — TELEPHONE (OUTPATIENT)
Dept: CARDIOLOGY | Facility: MEDICAL CENTER | Age: 41
End: 2021-12-30

## 2021-12-30 NOTE — TELEPHONE ENCOUNTER
Spoke to patient over phone. Advised that I would speak with HK regarding donating blood and get back to him early next week. Patient verbalizes understanding. Message routed to HK for advisement.         To HK: Please advise

## 2021-12-30 NOTE — TELEPHONE ENCOUNTER
HK     Patient called to see if they can get a letter from the provider stating they are okay to donate blood. They can be reached at [phone].    Thank you,  Tresa VALLADARES

## 2022-01-05 NOTE — TELEPHONE ENCOUNTER
Message  Received: Today  BAILEE Walsh R.N.  Caller: Unspecified (6 days ago,  9:18 AM)  Did he get his testings done?  I would hold off until testings completed prior to blood donation.   _________________________________________________________________    Spoke to patient over phone. Discussed HK's recommendations.     Patient states he cannot make the Echo appointment on 02/23/22. He requests appointment be cancelled. Per patient, he will call imaging to reschedule. Echo cancelled.

## 2022-04-07 PROBLEM — G56.02 CARPAL TUNNEL SYNDROME, LEFT: Status: ACTIVE | Noted: 2022-04-07

## 2022-06-17 ENCOUNTER — TELEPHONE (OUTPATIENT)
Dept: SURGERY | Facility: MEDICAL CENTER | Age: 42
End: 2022-06-17
Payer: OTHER GOVERNMENT

## 2022-06-24 ENCOUNTER — TELEPHONE (OUTPATIENT)
Dept: SURGERY | Facility: MEDICAL CENTER | Age: 42
End: 2022-06-24
Payer: OTHER GOVERNMENT

## 2023-02-21 ENCOUNTER — HOSPITAL ENCOUNTER (EMERGENCY)
Facility: MEDICAL CENTER | Age: 43
End: 2023-02-21
Attending: EMERGENCY MEDICINE
Payer: COMMERCIAL

## 2023-02-21 VITALS
TEMPERATURE: 99.6 F | HEIGHT: 70 IN | SYSTOLIC BLOOD PRESSURE: 136 MMHG | HEART RATE: 95 BPM | DIASTOLIC BLOOD PRESSURE: 76 MMHG | WEIGHT: 282.63 LBS | OXYGEN SATURATION: 95 % | RESPIRATION RATE: 18 BRPM | BODY MASS INDEX: 40.46 KG/M2

## 2023-02-21 DIAGNOSIS — M25.561 ACUTE PAIN OF RIGHT KNEE: ICD-10-CM

## 2023-02-21 DIAGNOSIS — M23.91 KNEE LOCKING, RIGHT: ICD-10-CM

## 2023-02-21 PROCEDURE — 99284 EMERGENCY DEPT VISIT MOD MDM: CPT

## 2023-02-21 ASSESSMENT — FIBROSIS 4 INDEX: FIB4 SCORE: 0.57

## 2023-02-21 NOTE — ED PROVIDER NOTES
"  ER Provider Note    Scribed for William Grace M.d. by Quyen Mccrary. 2/21/2023  6:28 AM    Primary Care Provider: Lokesh Rodrigues M.D.    CHIEF COMPLAINT  Chief Complaint   Patient presents with    Knee Pain     Right knee     EXTERNAL RECORDS REVIEWED  Care everywhere   and PDMP his last narcotic prescription was 1 year ago    HPI/ROS  LIMITATION TO HISTORY   Select: : None  OUTSIDE HISTORIAN(S):  none    Jared Chen is a 42 y.o. male who presents to the ED complaining of right knee pain. Patient describes he injured his knee a while go and his pain improved, but he kneeled down a week ago which caused worsening pain. He states his knee \"gets stuck\" and he has increased pain with range of motion. He was seen at the free standing ER and had an x-ray that was normal. Patient has been using crutches and wrapped his knee with an ace bandage, but his pain has continued to worsen. Denies following with orthopedics.    PAST MEDICAL HISTORY  Past Medical History:   Diagnosis Date    Heart murmur        SURGICAL HISTORY  Past Surgical History:   Procedure Laterality Date    MANDIBLE FRACTURE ORIF  3/30/2009    Performed by JASPER FRANKLIN at [facility]    ARCH BAR APPLICATION  3/30/2009    Performed by JASPER FRANKLIN at [facility]       FAMILY HISTORY  History reviewed. No pertinent family history.    SOCIAL HISTORY   reports that he has quit smoking. His smoking use included cigarettes. He smoked an average of .25 packs per day. He has quit using smokeless tobacco. He reports current alcohol use of about 0.6 oz per week. He reports that he does not currently use drugs after having used the following drugs: Inhaled and Marijuana.    CURRENT MEDICATIONS  Previous Medications    ACETAMINOPHEN (TYLENOL PO)    Take  by mouth every day.    ACETAMINOPHEN (TYLENOL) 325 MG TAB        IBUPROFEN (MOTRIN) 200 MG TAB    Take 1,000 mg by mouth every 8 hours as needed for Mild Pain. " "      ALLERGIES  Sulfa drugs    PHYSICAL EXAM  BP (!) 131/90   Pulse 87   Temp 37 °C (98.6 °F) (Temporal)   Resp 16   Ht 1.778 m (5' 10\")   Wt (!) 128 kg (282 lb 10.1 oz)   SpO2 96%   BMI 40.55 kg/m²   Nursing note and vitals reviewed.  Constitutional: No distress.   HENT: Head is atraumatic. Oropharynx is moist.   Eyes: Conjunctivae are normal. Pupils are equal, round, and reactive to light.   Cardiovascular: Normal peripheral perfusion  Respiratory: No respiratory distress.   Musculoskeletal: mild tenderness along the medial joint line of the right knee, no knee effusion, no erythema.  Neurological: Alert. No focal deficits noted.    Skin: No rash.   Psych: Appropriate for clinical situation    COURSE & MEDICAL DECISION MAKING     ED Observation Status? No; Patient does not meet criteria for ED Observation.     INITIAL ASSESSMENT, COURSE AND PLAN  6:32 AM - Patient seen and examined at bedside. Discussed plan of care, including follow up with orthopedics. Provided him with a referral to orthopedics. Advised the patient to stay off his knee as much as possible. He already has crutches and will be provided with a knee immobilizer. Patient agrees to the plan of care. Discussed plan for discharge and return precautions. He verbalizes agreement with discharge and plan of care.     Care Narrative: Patient presents today with locking of his knee.  I suspect that he had a cartilage injury.  Possible buckle handle injury.  Already had an x-ray.  X-ray showed no bony injury.  Patient is not locked at this time.  He will be treated with a knee immobilizer and crutches.  He is to be nonweightbearing.  Follow-up with orthopedics.  Referral submitted.    I considered imaging including ultrasound to rule out deep venous thrombosis.  However given the distribution of pain I do not feel that he likely has a DVT.  Low Wells score.  I do not feel that repeat x-ray would be beneficial given he has not had a new injury.  I also " feel that his history is most suggestive of a cartilaginous injury which would not be visualized on x-ray.  Therefore I did forego the x-ray.    DISPOSITION AND DISCUSSIONS  I have discussed management of the patient with the following physicians and LISSETH's:  none    Discussion of management with other [facility] or appropriate source(s): None     Escalation of care considered, and ultimately not performed: diagnostic imaging and acute inpatient care management, however at this time, the patient is most appropriate for outpatient management.    Barriers to care at this time, including but not limited to:  none .     Decision tools and prescription drugs considered including, but not limited to: Pain Medications   .    The patient will return for new or worsening symptoms and is stable at the time of discharge.    DISPOSITION:  Patient will be discharged home in stable condition.    FOLLOW UP:  [facility], Emergency Dept  [address] [place] [id number] [phone]    If symptoms worsen    Gee Lizarraga M.D.  [address] [facility] [id number] [phone]    Schedule an appointment as soon as possible for a visit   on-call orthopedic specialist    FINAL DIANGOSIS  1. Acute pain of right knee    2. Knee locking, right          IQuyen (Scriblauren), am scribing for, and in the presence of, William Grace M.D..    Electronically signed by: Quyen Mccrary (Vielka), 2/21/2023    William FREED M.D. personally performed the services described in this documentation, as scribed by Quyen Mccrary in my presence, and it is both accurate and complete.   The note accurately reflects work and decisions made by me.  William Grace M.D.  2/21/2023  11:27 AM

## 2023-02-21 NOTE — ED TRIAGE NOTES
"Chief Complaint   Patient presents with    Knee Pain     Right knee     Patient ambulatory to triage with crutches for the above complaint. Patient was seen on Sunday for the same complaint and had XR imaging done. Per patient, XR was normal and he was discharged but pain has persisted and gotten worse.     Pt is alert and oriented, speaking in full sentences, follows commands and responds appropriately to questions. Resp are even and unlabored.      Pt placed in lobby. Pt educated on triage process. Pt encouraged to alert staff for any changes.     Patient and staff wearing appropriate PPE.    BP (!) 131/90   Pulse 87   Temp 37 °C (98.6 °F) (Temporal)   Resp 16   Ht 1.778 m (5' 10\")   Wt (!) 128 kg (282 lb 10.1 oz)   SpO2 96%    "

## 2023-02-21 NOTE — ED NOTES
ERP at bedside.    [Normal] : oriented to person, place and time, the affect was normal, the mood was normal and not anxious

## 2023-02-21 NOTE — ED NOTES
Patient provided discharge education and verbalizes understanding. Patient ambulated to the front lobby with crutches and all belongings.

## 2023-02-25 ENCOUNTER — HOSPITAL ENCOUNTER (EMERGENCY)
Facility: MEDICAL CENTER | Age: 43
End: 2023-02-25
Attending: EMERGENCY MEDICINE
Payer: COMMERCIAL

## 2023-02-25 VITALS
TEMPERATURE: 98 F | OXYGEN SATURATION: 95 % | RESPIRATION RATE: 18 BRPM | HEART RATE: 103 BPM | DIASTOLIC BLOOD PRESSURE: 83 MMHG | WEIGHT: 283.07 LBS | BODY MASS INDEX: 40.62 KG/M2 | SYSTOLIC BLOOD PRESSURE: 141 MMHG

## 2023-02-25 DIAGNOSIS — J02.9 VIRAL PHARYNGITIS: ICD-10-CM

## 2023-02-25 LAB — S PYO DNA SPEC NAA+PROBE: NOT DETECTED

## 2023-02-25 PROCEDURE — 700111 HCHG RX REV CODE 636 W/ 250 OVERRIDE (IP): Performed by: EMERGENCY MEDICINE

## 2023-02-25 PROCEDURE — 87651 STREP A DNA AMP PROBE: CPT

## 2023-02-25 PROCEDURE — 99283 EMERGENCY DEPT VISIT LOW MDM: CPT

## 2023-02-25 RX ORDER — DEXAMETHASONE SODIUM PHOSPHATE 4 MG/ML
10 INJECTION, SOLUTION INTRA-ARTICULAR; INTRALESIONAL; INTRAMUSCULAR; INTRAVENOUS; SOFT TISSUE ONCE
Status: COMPLETED | OUTPATIENT
Start: 2023-02-25 | End: 2023-02-25

## 2023-02-25 RX ADMIN — DEXAMETHASONE SODIUM PHOSPHATE 10 MG: 4 INJECTION, SOLUTION INTRA-ARTICULAR; INTRALESIONAL; INTRAMUSCULAR; INTRAVENOUS; SOFT TISSUE at 16:35

## 2023-02-25 ASSESSMENT — FIBROSIS 4 INDEX: FIB4 SCORE: 0.57

## 2023-02-25 NOTE — ED TRIAGE NOTES
Chief Complaint   Patient presents with    Sore Throat     X 3 days. Prior flu like s/s.         BP (!) 146/89   Pulse (!) 107   Temp 36.8 °C (98.2 °F) (Temporal)   Resp 18   SpO2 94%

## 2023-02-26 NOTE — ED NOTES
Pt discharged, all appropriate hospital equipment removed (IV, monitor, pulse ox, etc.). Pt left unit via walking with partner to vehicle for home. Personal belongings with pt when leaving unit. Pt given discharge instructions prior to leaving unit including where to  prescriptions and when to follow-up; verbalizes understanding. Pt informed to return to ED if symptoms worsen/return or altered status develop. Copy of discharge instructions signed and turned into DC basket and copy sent with pt. F/u with PCP

## 2023-02-26 NOTE — ED NOTES
Partner at bedside, requesting pt to be tested for COVID/flu/RSV, concerned for children who are being seen in peds ER currently for similar sx. Pt stating R ear pain that was not mentioned to ERP.

## 2023-02-26 NOTE — ED PROVIDER NOTES
"ED Provider Note    CHIEF COMPLAINT  Chief Complaint   Patient presents with    Sore Throat     X 3 days. Prior flu like s/s.        EXTERNAL RECORDS REVIEWED  Outpatient Notes patient was seen 4 days ago for any complaint    HPI/ROS  LIMITATION TO HISTORY   Select: : None  OUTSIDE HISTORIAN(S):  None    Jared Chen is a 42 y.o. male who presents to the emerged part with chief complaint of worsening sore throat.  Patient states he had a little bit of upper respiratory infection last week but then the last 3 days that progressively worsening sore throat.  He states gone the point that it really hurts to even drink water and he noticed a little bit of a change in his voice and he is never had anything like this before.  He has had low-grade fevers at home but nothing that he is noted no nausea no vomiting no shortness of breath no chest pain.  He states it hurts to swallow but he still able to swallow    PAST MEDICAL HISTORY   has a past medical history of Heart murmur.    SURGICAL HISTORY   has a past surgical history that includes mandible fracture orif (3/30/2009) and arch bar application (3/30/2009).    FAMILY HISTORY  History reviewed. No pertinent family history.    SOCIAL HISTORY  Social History     Tobacco Use    Smoking status: Former     Packs/day: 0.25     Types: Cigarettes    Smokeless tobacco: Former   Vaping Use    Vaping Use: Never used   Substance and Sexual Activity    Alcohol use: Yes     Alcohol/week: 0.6 oz     Types: 1 Cans of beer per week     Comment: occ     Drug use: Not Currently     Types: Inhaled, Marijuana     Comment: occ / wants to stop due to upcoming job    Sexual activity: Not on file       CURRENT MEDICATIONS  Home Medications    **Home medications have not yet been reviewed for this encounter**         ALLERGIES  Allergies   Allergen Reactions    Sulfa Drugs Unspecified     Pt states \"I was a childhood allergie, not sure what happens\".         PHYSICAL EXAM  VITAL " SIGNS: BP (!) 146/89   Pulse (!) 107   Temp 36.8 °C (98.2 °F) (Temporal)   Resp 18   Wt (!) 128 kg (283 lb 1.1 oz)   SpO2 94%   BMI 40.62 kg/m²    Pulse OX: Pulse Oxygen level is within normal limits  Constitutional: Alert in no apparent distress.  HENT: Normocephalic, Atraumatic, MMM, anterior cervical lymphadenopathy bilaterally uvula is midline mildly swollen bilateral erythematous tonsils with exudates no trismus we did does have little bit of a hoarse voice no swelling under the tongue not drooling not tripoding  Eyes: PERound. Conjunctiva normal, non-icteric.   Heart: Regular rate and rhythm, intact distal pulses   Lungs: Symmetrical movement, no resp distress   Abdomen: Non-tender, non-distended, normal bowel sounds  Skin: Warm, Dry, No erythema, No rash.   Neurologic: Alert and oriented, Grossly non-focal.       DIAGNOSTIC STUDIES / PROCEDURES  \  Labs Reviewed   GROUP A STREP BY PCR           COURSE & MEDICAL DECISION MAKING    ED Observation Status? No; Patient does not meet criteria for ED Observation.     INITIAL ASSESSMENT, COURSE AND PLAN  Care Narrative patient presents emerged department physical examination likely consistent with a strep pharyngitis, his Centor score is 3 based on physical examination will be tested for strep and given Decadron for the swelling of the uvula and the posterior oropharynx.  Uvula is midline without trismus so I doubt a deep space neck infection such as RPA PTA or Jacky's angina.  He is protecting his airway quite well at this time without any severe distress.    6:14 PM recheck patient at the bedside he is feeling better after the Decadron we discussed his strep findings and he feels comfortable at home.    HTN/IDDM FOLLOW UP:  The patient is referred to a primary physician for blood pressure management, diabetic screening, and for all other preventive health concerns        DISPOSITION AND DISCUSSIONS    Patient presents with a viral upper respiratory  infection as well as a viral pharyngitis with a negative strep test.  There are multiple children at home who are also ill some have flu in a long discussion with the patient's wife he is not hypoxic is not short of breath if he does have a viral illness such as flu or COVID to take precautions at home good handwashing good hygiene ibuprofen Tylenol and symptomatic management.  We also discussed to watch for any uvula deviation or any worsening sore throat or difficulty swallowing over the next few days and return for PTA precautions.  They understand and feel comfortable going home      Escalation of care considered, and ultimately not performed:blood analysis and acute inpatient care management, however at this time, the patient is most appropriate for outpatient management    The patient will return for new or worsening symptoms and is stable at the time of discharge.    The patient is referred to a primary physician for blood pressure management, diabetic screening, and for all other preventative health concerns.    Patient Centor score was 3 we did test him for strep which was negative so he will not be prescribed antibiotics    DISPOSITION:  Patient will be discharged home in stable condition.    FOLLOW UP:  Lokesh Rodrigues M.D.  [address] [place] [id number] [phone]    Schedule an appointment as soon as possible for a visit       [facility], Emergency Dept  [address] [facility] [address] [phone]    If symptoms worsen - if your uvula deviates to one side and one side of the back of your throat becomes more swollen      OUTPATIENT MEDICATIONS:  Discharge Medication List as of 2/25/2023  6:25 PM            FINAL DIAGNOSIS  1. Viral pharyngitis           Electronically signed by: Yasmin Dejesus M.D., 2/25/2023 4:11 PM

## 2023-08-24 ENCOUNTER — APPOINTMENT (OUTPATIENT)
Dept: RADIOLOGY | Facility: MEDICAL CENTER | Age: 43
End: 2023-08-24
Attending: NURSE PRACTITIONER
Payer: COMMERCIAL

## 2023-09-01 ENCOUNTER — APPOINTMENT (OUTPATIENT)
Dept: RADIOLOGY | Facility: MEDICAL CENTER | Age: 43
End: 2023-09-01
Attending: NURSE PRACTITIONER
Payer: OTHER GOVERNMENT